# Patient Record
Sex: FEMALE | Race: WHITE | NOT HISPANIC OR LATINO | Employment: OTHER | URBAN - METROPOLITAN AREA
[De-identification: names, ages, dates, MRNs, and addresses within clinical notes are randomized per-mention and may not be internally consistent; named-entity substitution may affect disease eponyms.]

---

## 2023-06-12 PROBLEM — R05.3 CHRONIC COUGH: Status: ACTIVE | Noted: 2023-06-12

## 2023-06-12 PROBLEM — R74.8 ELEVATED ALKALINE PHOSPHATASE LEVEL: Status: ACTIVE | Noted: 2023-06-12

## 2023-06-12 PROBLEM — L93.0 CHRONIC DISCOID LUPUS ERYTHEMATOSUS: Status: ACTIVE | Noted: 2023-06-12

## 2023-06-12 RX ORDER — HYDROXYCHLOROQUINE SULFATE 200 MG/1
1.5 TABLET, FILM COATED ORAL DAILY
COMMUNITY
Start: 2021-03-29 | End: 2023-06-16 | Stop reason: ALTCHOICE

## 2023-06-12 RX ORDER — VARENICLINE TARTRATE 1 MG/1
1 TABLET, FILM COATED ORAL 2 TIMES DAILY
COMMUNITY
End: 2023-06-16 | Stop reason: SINTOL

## 2023-06-16 ENCOUNTER — OFFICE VISIT (OUTPATIENT)
Dept: PRIMARY CARE | Facility: CLINIC | Age: 58
End: 2023-06-16
Payer: COMMERCIAL

## 2023-06-16 VITALS
SYSTOLIC BLOOD PRESSURE: 124 MMHG | WEIGHT: 143 LBS | DIASTOLIC BLOOD PRESSURE: 76 MMHG | HEIGHT: 58 IN | BODY MASS INDEX: 30.02 KG/M2

## 2023-06-16 DIAGNOSIS — Z00.00 HEALTH CARE MAINTENANCE: Primary | ICD-10-CM

## 2023-06-16 DIAGNOSIS — F17.210 SMOKING GREATER THAN 30 PACK YEARS: ICD-10-CM

## 2023-06-16 DIAGNOSIS — Z12.31 ENCOUNTER FOR SCREENING MAMMOGRAM FOR MALIGNANT NEOPLASM OF BREAST: ICD-10-CM

## 2023-06-16 DIAGNOSIS — R53.83 OTHER FATIGUE: ICD-10-CM

## 2023-06-16 PROCEDURE — 99396 PREV VISIT EST AGE 40-64: CPT | Performed by: INTERNAL MEDICINE

## 2023-06-16 PROCEDURE — 88175 CYTOPATH C/V AUTO FLUID REDO: CPT

## 2023-06-16 RX ORDER — BUPROPION HYDROCHLORIDE 150 MG/1
150 TABLET, EXTENDED RELEASE ORAL 2 TIMES DAILY
Qty: 60 TABLET | Refills: 1 | Status: SHIPPED | OUTPATIENT
Start: 2023-06-16 | End: 2023-10-26 | Stop reason: WASHOUT

## 2023-06-16 ASSESSMENT — PATIENT HEALTH QUESTIONNAIRE - PHQ9
2. FEELING DOWN, DEPRESSED OR HOPELESS: NOT AT ALL
1. LITTLE INTEREST OR PLEASURE IN DOING THINGS: NOT AT ALL
SUM OF ALL RESPONSES TO PHQ9 QUESTIONS 1 AND 2: 0

## 2023-06-16 NOTE — PROGRESS NOTES
"Reason for Visit: Annual Physical Exam  Allergies and Medications  Pollen extracts  Current Outpatient Medications   Medication Instructions    buPROPion SR (WELLBUTRIN SR) 150 mg, oral, 2 times daily, Do not crush, chew, or split.    hydroxychloroquine (Plaquenil) 200 mg tablet 1.5 tablets, oral, Daily    varenicline (Chantix Continuing Month Box) 1 mg tablet 1 tablet, oral, 2 times daily     HPI:  57-year-old female patient presenting to the office today for her annual examination and to establish care.    Patient denies any chest pain and shortness of breath not even on exertion, she denies abdominal pain nausea vomiting changes in the bowel habits she is occasionally having loose stools and sometimes she has an external hemorrhoid that occasionally she noticed some blood on the toilet paper.  She denies any urinary symptoms.  Her appetite is good and energy level is adequate.    ROS otherwise negative aside from what was mentioned above in HPI.    Vitals  /76   Ht 1.461 m (4' 9.5\")   Wt 64.9 kg (143 lb)   BMI 30.41 kg/m²   Body mass index is 30.41 kg/m².  Physical Exam  Physical Exam       Active Problem List    Comprehensive Medical/Surgical/Social/Family History  Past Medical History:   Diagnosis Date    Other conditions influencing health status     No significant past medical history    Personal history of other diseases of the female genital tract     History of acute pelvic inflammatory disease     Past Surgical History:   Procedure Laterality Date    OTHER SURGICAL HISTORY  12/09/2020    Oophorectomy     Social History     Social History Narrative    Not on file   Patient continues to smoke she smoked for 30 years 1 pack/day and she continues to drink alcohol she drinks 1 glass of wine every night sometimes a seltzer and more on the weekends.  She works in the financial firm as a consultant.  She does not have any children she is single.    Family history:  Her father did die 6 years ago he was " in his early 90s.  Her mother is living in good health and she is living independently.  No family history of colon cancer or breast cancer and she has 1 sister healthy.  No family history on file.       Assessment and Plan:  Problem List Items Addressed This Visit    None  Visit Diagnoses       Health care maintenance    -  Primary    Relevant Orders    CBC    Comprehensive Metabolic Panel    Lipid Panel    TSH with reflex to Free T4 if abnormal    Vitamin D 25 hydroxy    BI mammo bilateral screening tomosynthesis    THINPREP PAP    Other fatigue        Relevant Orders    Vitamin D 25 hydroxy    Smoking greater than 30 pack years        Relevant Medications    buPROPion SR (Wellbutrin SR) 150 mg 12 hr tablet        57-year-old patient with the following issues.    1.  Ongoing tobacco use today we did discuss significantly the importance of quitting smoking.  Patient is interested in Wellbutrin and a prescription will be provided.  And hopefully the patient will succeed.    2.  Concerns regarding excessive alcohol use we did discuss the importance of cutting back on alcohol use and we are going to check the liver function test and we will discuss the results with the patient once available.    3.  Health maintenance issues, Pap smear was updated patient had an abnormal Pap within the last 5 years.  Mammogram requested she is up-to-date on her colonoscopy.  Vaccinations are up-to-date.    Disposition, I will see the patient back in the office in 1 year for repeat physical in 6 months for follow-up

## 2023-06-19 ENCOUNTER — LAB (OUTPATIENT)
Dept: LAB | Facility: LAB | Age: 58
End: 2023-06-19
Payer: COMMERCIAL

## 2023-06-19 DIAGNOSIS — Z00.00 HEALTH CARE MAINTENANCE: ICD-10-CM

## 2023-06-19 DIAGNOSIS — R53.83 OTHER FATIGUE: ICD-10-CM

## 2023-06-19 LAB
ALANINE AMINOTRANSFERASE (SGPT) (U/L) IN SER/PLAS: 29 U/L (ref 7–45)
ALBUMIN (G/DL) IN SER/PLAS: 4 G/DL (ref 3.4–5)
ALKALINE PHOSPHATASE (U/L) IN SER/PLAS: 148 U/L (ref 33–110)
ANION GAP IN SER/PLAS: 12 MMOL/L (ref 10–20)
ASPARTATE AMINOTRANSFERASE (SGOT) (U/L) IN SER/PLAS: 23 U/L (ref 9–39)
BILIRUBIN TOTAL (MG/DL) IN SER/PLAS: 0.6 MG/DL (ref 0–1.2)
CALCIDIOL (25 OH VITAMIN D3) (NG/ML) IN SER/PLAS: 25 NG/ML
CALCIUM (MG/DL) IN SER/PLAS: 9.9 MG/DL (ref 8.6–10.3)
CARBON DIOXIDE, TOTAL (MMOL/L) IN SER/PLAS: 26 MMOL/L (ref 21–32)
CHLORIDE (MMOL/L) IN SER/PLAS: 105 MMOL/L (ref 98–107)
CHOLESTEROL (MG/DL) IN SER/PLAS: 284 MG/DL (ref 0–199)
CHOLESTEROL IN HDL (MG/DL) IN SER/PLAS: 41.9 MG/DL
CHOLESTEROL/HDL RATIO: 6.8
CREATININE (MG/DL) IN SER/PLAS: 0.71 MG/DL (ref 0.5–1.05)
ERYTHROCYTE DISTRIBUTION WIDTH (RATIO) BY AUTOMATED COUNT: 13.1 % (ref 11.5–14.5)
ERYTHROCYTE MEAN CORPUSCULAR HEMOGLOBIN CONCENTRATION (G/DL) BY AUTOMATED: 33.3 G/DL (ref 32–36)
ERYTHROCYTE MEAN CORPUSCULAR VOLUME (FL) BY AUTOMATED COUNT: 95 FL (ref 80–100)
ERYTHROCYTES (10*6/UL) IN BLOOD BY AUTOMATED COUNT: 4.66 X10E12/L (ref 4–5.2)
GFR FEMALE: >90 ML/MIN/1.73M2
GLUCOSE (MG/DL) IN SER/PLAS: 100 MG/DL (ref 74–99)
HEMATOCRIT (%) IN BLOOD BY AUTOMATED COUNT: 44.2 % (ref 36–46)
HEMOGLOBIN (G/DL) IN BLOOD: 14.7 G/DL (ref 12–16)
LDL: 204 MG/DL (ref 0–99)
LEUKOCYTES (10*3/UL) IN BLOOD BY AUTOMATED COUNT: 8.3 X10E9/L (ref 4.4–11.3)
PLATELETS (10*3/UL) IN BLOOD AUTOMATED COUNT: 449 X10E9/L (ref 150–450)
POTASSIUM (MMOL/L) IN SER/PLAS: 4 MMOL/L (ref 3.5–5.3)
PROTEIN TOTAL: 7.5 G/DL (ref 6.4–8.2)
SODIUM (MMOL/L) IN SER/PLAS: 139 MMOL/L (ref 136–145)
THYROTROPIN (MIU/L) IN SER/PLAS BY DETECTION LIMIT <= 0.05 MIU/L: 6.31 MIU/L (ref 0.44–3.98)
THYROXINE (T4) FREE (NG/DL) IN SER/PLAS: 0.84 NG/DL (ref 0.61–1.12)
TRIGLYCERIDE (MG/DL) IN SER/PLAS: 192 MG/DL (ref 0–149)
UREA NITROGEN (MG/DL) IN SER/PLAS: 11 MG/DL (ref 6–23)
VLDL: 38 MG/DL (ref 0–40)

## 2023-06-19 PROCEDURE — 85027 COMPLETE CBC AUTOMATED: CPT

## 2023-06-19 PROCEDURE — 84443 ASSAY THYROID STIM HORMONE: CPT

## 2023-06-19 PROCEDURE — 80061 LIPID PANEL: CPT

## 2023-06-19 PROCEDURE — 84439 ASSAY OF FREE THYROXINE: CPT

## 2023-06-19 PROCEDURE — 80053 COMPREHEN METABOLIC PANEL: CPT

## 2023-06-19 PROCEDURE — 82306 VITAMIN D 25 HYDROXY: CPT

## 2023-06-19 PROCEDURE — 36415 COLL VENOUS BLD VENIPUNCTURE: CPT

## 2023-06-22 LAB
COMPLETE PATHOLOGY REPORT: NORMAL
CONVERTED CLINICAL DIAGNOSIS-HISTORY: NORMAL
CONVERTED DIAGNOSIS COMMENT: NORMAL
CONVERTED FINAL DIAGNOSIS: NORMAL
CONVERTED FINAL REPORT PDF LINK TO COPY AND PASTE: NORMAL

## 2023-06-28 ENCOUNTER — TELEPHONE (OUTPATIENT)
Dept: PRIMARY CARE | Facility: CLINIC | Age: 58
End: 2023-06-28
Payer: COMMERCIAL

## 2023-07-20 ENCOUNTER — OFFICE VISIT (OUTPATIENT)
Dept: PRIMARY CARE | Facility: CLINIC | Age: 58
End: 2023-07-20
Payer: COMMERCIAL

## 2023-07-20 VITALS
HEIGHT: 58 IN | BODY MASS INDEX: 29.39 KG/M2 | SYSTOLIC BLOOD PRESSURE: 142 MMHG | OXYGEN SATURATION: 96 % | WEIGHT: 140 LBS | HEART RATE: 78 BPM | DIASTOLIC BLOOD PRESSURE: 82 MMHG

## 2023-07-20 DIAGNOSIS — F17.200 SMOKING ADDICTION: ICD-10-CM

## 2023-07-20 DIAGNOSIS — R74.8 ELEVATED ALKALINE PHOSPHATASE LEVEL: Primary | ICD-10-CM

## 2023-07-20 DIAGNOSIS — E78.5 HYPERLIPIDEMIA, UNSPECIFIED HYPERLIPIDEMIA TYPE: ICD-10-CM

## 2023-07-20 DIAGNOSIS — R79.89 ELEVATED TSH: ICD-10-CM

## 2023-07-20 PROCEDURE — 99215 OFFICE O/P EST HI 40 MIN: CPT | Performed by: INTERNAL MEDICINE

## 2023-07-20 RX ORDER — IBUPROFEN 200 MG
1 TABLET ORAL EVERY 24 HOURS
Qty: 30 PATCH | Refills: 0 | Status: SHIPPED | OUTPATIENT
Start: 2023-07-20 | End: 2023-10-26 | Stop reason: WASHOUT

## 2023-07-20 RX ORDER — NICOTINE 7MG/24HR
1 PATCH, TRANSDERMAL 24 HOURS TRANSDERMAL EVERY 24 HOURS
Qty: 14 PATCH | Refills: 0 | Status: SHIPPED | OUTPATIENT
Start: 2023-07-20 | End: 2023-10-26 | Stop reason: WASHOUT

## 2023-07-20 RX ORDER — ATORVASTATIN CALCIUM 20 MG/1
20 TABLET, FILM COATED ORAL DAILY
Qty: 30 TABLET | Refills: 5 | Status: SHIPPED | OUTPATIENT
Start: 2023-07-20 | End: 2024-01-16

## 2023-07-20 NOTE — PROGRESS NOTES
"Subjective   Patient ID: [id number]    HPI   Rosalba Edwards is a 57 y.o. female who presents for Follow-up.    Current Outpatient Medications:     buPROPion SR (Wellbutrin SR) 150 mg 12 hr tablet, Take 1 tablet (150 mg) by mouth 2 times a day. Do not crush, chew, or split. (Patient not taking: Reported on 7/20/2023), Disp: 60 tablet, Rfl: 1  HPI  57-year-old female patient presenting to the office today to discuss lab results and to also discuss smoking cessation.    Patient had her blood work done and she is here to go over the results.  Also she is interested in smoking cessation she had a prescription for Wellbutrin but she was reluctant to use it because of the risk of taking Wellbutrin with alcohol consumption.  We did discuss her alcohol consumption she does drink every night at least a glass of wine and more on the weekend.  We did discuss the risk of excessive alcohol consumption and the poor effects on the liver.  We discussed options for smoking cessation including Chantix and nicotine patches as well.  Review of system was reviewed all normal except what is noted in HPI   Past Medical History:   Diagnosis Date    Other conditions influencing health status     No significant past medical history    Personal history of other diseases of the female genital tract     History of acute pelvic inflammatory disease      Objective   /82   Pulse 78   Ht 1.461 m (4' 9.5\")   Wt 63.5 kg (140 lb)   SpO2 96%   BMI 29.77 kg/m²      Physical Exam  Constitutional:       Appearance: Normal appearance.   Cardiovascular:      Rate and Rhythm: Normal rate and regular rhythm.      Pulses: Normal pulses.      Heart sounds: Normal heart sounds.   Pulmonary:      Effort: Pulmonary effort is normal.      Breath sounds: Normal breath sounds.   Neurological:      General: No focal deficit present.      Mental Status: She is alert. Mental status is at baseline.   Psychiatric:         Mood and Affect: Mood normal.         " Behavior: Behavior normal.         Thought Content: Thought content normal.         Judgment: Judgment normal.       Component      Latest Ref Rng 6/19/2023   GLUCOSE      74 - 99 mg/dL 100 (H)    SODIUM      136 - 145 mmol/L 139    POTASSIUM      3.5 - 5.3 mmol/L 4.0    CHLORIDE      98 - 107 mmol/L 105    Bicarbonate      21 - 32 mmol/L 26    Anion Gap      10 - 20 mmol/L 12    Blood Urea Nitrogen      6 - 23 mg/dL 11    Creatinine      0.50 - 1.05 mg/dL 0.71    GFR Female      >90 mL/min/1.73m2 >90    Calcium      8.6 - 10.3 mg/dL 9.9    Albumin      3.4 - 5.0 g/dL 4.0    Alkaline Phosphatase      33 - 110 U/L 148 (H)    Total Protein      6.4 - 8.2 g/dL 7.5    AST      9 - 39 U/L 23    Bilirubin Total      0.0 - 1.2 mg/dL 0.6    ALT      7 - 45 U/L 29    WBC      4.4 - 11.3 x10E9/L 8.3    RBC      4.00 - 5.20 x10E12/L 4.66    HEMOGLOBIN      12.0 - 16.0 g/dL 14.7    HEMATOCRIT      36.0 - 46.0 % 44.2    MCV      80 - 100 fL 95    MCHC      32.0 - 36.0 g/dL 33.3    Platelets      150 - 450 x10E9/L 449    RED CELL DISTRIBUTION WIDTH      11.5 - 14.5 % 13.1    CHOLESTEROL      0 - 199 mg/dL 284 (H)    HDL CHOLESTEROL      mg/dL 41.9    Cholesterol/HDL Ratio 6.8 !    LDL      0 - 99 mg/dL 204 (H)    VLDL      0 - 40 mg/dL 38    TRIGLYCERIDES      0 - 149 mg/dL 192 (H)    Thyroid Stimulating Hormone      0.44 - 3.98 mIU/L 6.31 (H)    Vitamin D, 25-Hydroxy, Total      ng/mL 25 !    Thyroxine, Free      0.61 - 1.12 ng/dL 0.84         Assessment/Plan   Problem List Items Addressed This Visit    None  To 7-year-old patient with the following issues.    1.  Concerns regarding hyperlipidemia, at this point we are going to start the patient on atorvastatin 20 mg p.o. daily and I will see her back in 3 months for follow-up with repeat fasting lipid profile patient stated understanding meanwhile she was encouraged to monitor her diet and improve her activity as well.    2.  Ongoing tobacco use, we did discuss the importance  of smoking cessation.  We are going to try nicotine patches and hopefully the patient will be successful.    3.  Excessive alcohol consumption we did discuss the importance of cutting back on her alcohol intake and the dangers and the comorbidities associated with increase in excessive alcohol intake.    4.  Elevated alkaline phosphatase, at this point liver ultrasound will be obtained and I will discuss the results with the patient once available.  There could be evidence of fatty infiltration of the liver or I wonder if there is a component of alcoholic liver disease.    Disposition I will see the patient back in the office in 3 months for follow-up and sooner if needed.    Pushpa Meehan MD

## 2023-08-10 ENCOUNTER — TELEPHONE (OUTPATIENT)
Dept: PRIMARY CARE | Facility: CLINIC | Age: 58
End: 2023-08-10
Payer: COMMERCIAL

## 2023-10-06 ENCOUNTER — LAB (OUTPATIENT)
Dept: LAB | Facility: LAB | Age: 58
End: 2023-10-06
Payer: COMMERCIAL

## 2023-10-06 DIAGNOSIS — R74.8 ABNORMAL LEVELS OF OTHER SERUM ENZYMES: Primary | ICD-10-CM

## 2023-10-06 DIAGNOSIS — R79.89 ELEVATED TSH: ICD-10-CM

## 2023-10-06 DIAGNOSIS — E78.5 HYPERLIPIDEMIA, UNSPECIFIED HYPERLIPIDEMIA TYPE: ICD-10-CM

## 2023-10-06 LAB
AFP SERPL-MCNC: 4 NG/ML (ref 0–9)
ALBUMIN SERPL BCP-MCNC: 3.8 G/DL (ref 3.4–5)
ALP SERPL-CCNC: 167 U/L (ref 33–110)
ALT SERPL W P-5'-P-CCNC: 29 U/L (ref 7–45)
ANION GAP SERPL CALC-SCNC: 13 MMOL/L (ref 10–20)
AST SERPL W P-5'-P-CCNC: 26 U/L (ref 9–39)
BILIRUB DIRECT SERPL-MCNC: 0.1 MG/DL (ref 0–0.3)
BILIRUB SERPL-MCNC: 0.8 MG/DL (ref 0–1.2)
BUN SERPL-MCNC: 12 MG/DL (ref 6–23)
CALCIUM SERPL-MCNC: 9.5 MG/DL (ref 8.6–10.3)
CERULOPLASMIN SERPL-MCNC: 34.3 MG/DL (ref 20–60)
CHLORIDE SERPL-SCNC: 105 MMOL/L (ref 98–107)
CHOLEST SERPL-MCNC: 168 MG/DL (ref 0–199)
CHOLESTEROL/HDL RATIO: 3.4
CO2 SERPL-SCNC: 26 MMOL/L (ref 21–32)
CREAT SERPL-MCNC: 0.79 MG/DL (ref 0.5–1.05)
FERRITIN SERPL-MCNC: 234 NG/ML (ref 8–150)
GFR SERPL CREATININE-BSD FRML MDRD: 88 ML/MIN/1.73M*2
GGT SERPL-CCNC: 289 U/L (ref 5–55)
GLUCOSE SERPL-MCNC: 82 MG/DL (ref 74–99)
HAV IGM SER QL: NONREACTIVE
HBV CORE IGM SER QL: NONREACTIVE
HBV SURFACE AG SERPL QL IA: NONREACTIVE
HCV AB SER QL: NONREACTIVE
HDLC SERPL-MCNC: 49.6 MG/DL
INR PPP: 1 (ref 0.9–1.1)
IRON SATN MFR SERPL: 64 % (ref 25–45)
IRON SERPL-MCNC: 191 UG/DL (ref 35–150)
LDLC SERPL CALC-MCNC: 87 MG/DL (ref 140–190)
NON HDL CHOLESTEROL: 118 MG/DL (ref 0–149)
POTASSIUM SERPL-SCNC: 4.2 MMOL/L (ref 3.5–5.3)
PROT SERPL-MCNC: 7.1 G/DL (ref 6.4–8.2)
PROTHROMBIN TIME: 10.9 SECONDS (ref 9.8–12.8)
SODIUM SERPL-SCNC: 140 MMOL/L (ref 136–145)
T4 FREE SERPL-MCNC: 0.99 NG/DL (ref 0.61–1.12)
TIBC SERPL-MCNC: 300 UG/DL (ref 240–445)
TRIGL SERPL-MCNC: 156 MG/DL (ref 0–149)
TSH SERPL-ACNC: 9.86 MIU/L (ref 0.44–3.98)
UIBC SERPL-MCNC: 109 UG/DL (ref 110–370)
VLDL: 31 MG/DL (ref 0–40)

## 2023-10-06 PROCEDURE — 82248 BILIRUBIN DIRECT: CPT

## 2023-10-06 PROCEDURE — 80074 ACUTE HEPATITIS PANEL: CPT

## 2023-10-06 PROCEDURE — 85610 PROTHROMBIN TIME: CPT

## 2023-10-06 PROCEDURE — 86015 ACTIN ANTIBODY EACH: CPT

## 2023-10-06 PROCEDURE — 82105 ALPHA-FETOPROTEIN SERUM: CPT

## 2023-10-06 PROCEDURE — 83550 IRON BINDING TEST: CPT

## 2023-10-06 PROCEDURE — 84439 ASSAY OF FREE THYROXINE: CPT

## 2023-10-06 PROCEDURE — 36415 COLL VENOUS BLD VENIPUNCTURE: CPT

## 2023-10-06 PROCEDURE — 84443 ASSAY THYROID STIM HORMONE: CPT

## 2023-10-06 PROCEDURE — 80061 LIPID PANEL: CPT

## 2023-10-06 PROCEDURE — 80053 COMPREHEN METABOLIC PANEL: CPT

## 2023-10-06 PROCEDURE — 82977 ASSAY OF GGT: CPT

## 2023-10-06 PROCEDURE — 82104 ALPHA-1-ANTITRYPSIN PHENO: CPT

## 2023-10-06 PROCEDURE — 82728 ASSAY OF FERRITIN: CPT

## 2023-10-06 PROCEDURE — 86256 FLUORESCENT ANTIBODY TITER: CPT

## 2023-10-06 PROCEDURE — 86381 MITOCHONDRIAL ANTIBODY EACH: CPT

## 2023-10-06 PROCEDURE — 83540 ASSAY OF IRON: CPT

## 2023-10-06 PROCEDURE — 82390 ASSAY OF CERULOPLASMIN: CPT

## 2023-10-09 LAB
A1AT PHENOTYP SERPL-IMP: NORMAL
A1AT SERPL-MCNC: 163 MG/DL (ref 90–200)
MITOCHONDRIA AB SER QL IF: NEGATIVE

## 2023-10-10 ENCOUNTER — APPOINTMENT (OUTPATIENT)
Dept: PRIMARY CARE | Facility: CLINIC | Age: 58
End: 2023-10-10
Payer: COMMERCIAL

## 2023-10-10 LAB — SMOOTH MUSCLE AB SER QL IF: ABNORMAL

## 2023-10-13 ENCOUNTER — CLINICAL SUPPORT (OUTPATIENT)
Dept: GASTROENTEROLOGY | Facility: HOSPITAL | Age: 58
End: 2023-10-13
Payer: COMMERCIAL

## 2023-10-13 DIAGNOSIS — R74.8 ABNORMAL LEVELS OF OTHER SERUM ENZYMES: ICD-10-CM

## 2023-10-13 PROCEDURE — 91200 LIVER ELASTOGRAPHY: CPT

## 2023-10-13 PROCEDURE — 91200 LIVER ELASTOGRAPHY: CPT | Performed by: INTERNAL MEDICINE

## 2023-10-13 NOTE — PROGRESS NOTES
diagnosticPatient referred by Mercedes Randolph CNP for Fibroscan study for diagnosis of elevated alkaline phosphatase level.  Confirmed fasting for 3 hours prior  to study.    Fibroscan study obtained using M probe and 12 consecutive valid measurements obtained.  Patient tolerated procedure well.    Advised that Dr. Jacob Mitchell will read the study and contact her provider with the results.    JAY Cao, AMB-BC

## 2023-10-19 DIAGNOSIS — K76.0 NON-ALCOHOLIC FATTY LIVER DISEASE: Primary | ICD-10-CM

## 2023-10-26 ENCOUNTER — OFFICE VISIT (OUTPATIENT)
Dept: PRIMARY CARE | Facility: CLINIC | Age: 58
End: 2023-10-26
Payer: COMMERCIAL

## 2023-10-26 VITALS
DIASTOLIC BLOOD PRESSURE: 90 MMHG | WEIGHT: 130 LBS | OXYGEN SATURATION: 98 % | HEIGHT: 58 IN | SYSTOLIC BLOOD PRESSURE: 120 MMHG | BODY MASS INDEX: 27.29 KG/M2 | HEART RATE: 77 BPM

## 2023-10-26 DIAGNOSIS — E03.8 SUBCLINICAL HYPOTHYROIDISM: Primary | ICD-10-CM

## 2023-10-26 PROCEDURE — 99214 OFFICE O/P EST MOD 30 MIN: CPT | Performed by: INTERNAL MEDICINE

## 2023-10-26 PROCEDURE — 4004F PT TOBACCO SCREEN RCVD TLK: CPT | Performed by: INTERNAL MEDICINE

## 2023-10-26 NOTE — PROGRESS NOTES
Subjective   Patient ID: Rosalba Edwards is a 57 y.o. female who presents for Hyperlipidemia.    HPI   Patient presents today following up with hyperlipidemia. Patient is currently taking Atorvastatin with out any complications. Patient had blood work done on 10/6/2023 and would like to review the results today.     Review of Systems    Objective   There were no vitals taken for this visit.    Physical Exam    Assessment/Plan   Problem List Items Addressed This Visit    None

## 2023-10-26 NOTE — PROGRESS NOTES
"Subjective   Patient ID: [id number]     Rosalba Edwards is a 57 y.o. female who presents for Hyperlipidemia.    Current Outpatient Medications:     atorvastatin (Lipitor) 20 mg tablet, Take 1 tablet (20 mg) by mouth once daily., Disp: 30 tablet, Rfl: 5  HPI  57-year-old female patient presented to the office today for a follow-up visit.  Patient currently actively evaluated by the gastroenterology department and she is currently involved with hepatology for further evaluation of elevated liver function test and abnormal ultrasound of the liver.  She is scheduled to proceed with liver biopsy to try to determine the etiology.  There is a consideration of the possibility of lupus hepatitis.  Patient has modified her diet significantly after her hyperlipidemia diagnosis and she had lost significant amount of weight.  She also has cut back significantly on her alcohol consumption and that also led to some of the weight loss.  She has no other complaints or concerns today.  Review of system was reviewed all normal except what is noted in HPI   Past Medical History:   Diagnosis Date    Other conditions influencing health status     No significant past medical history    Personal history of other diseases of the female genital tract     History of acute pelvic inflammatory disease      Objective   /90 (BP Location: Right arm, Patient Position: Sitting)   Pulse 77   Ht 1.461 m (4' 9.5\")   Wt 59 kg (130 lb)   SpO2 98%   BMI 27.64 kg/m²      Physical Exam  Constitutional:       Appearance: Normal appearance.   Cardiovascular:      Rate and Rhythm: Normal rate and regular rhythm.      Pulses: Normal pulses.      Heart sounds: Normal heart sounds.   Pulmonary:      Effort: Pulmonary effort is normal.      Breath sounds: Normal breath sounds.   Neurological:      Mental Status: She is alert.       Assessment/Plan   Problem List Items Addressed This Visit    None  Visit Diagnoses       Subclinical hypothyroidism    -  " Primary    Relevant Orders    Referral to Endocrinology        57-year-old patient with the following issues.    1.  Undergoing further evaluation for elevated alkaline phosphatase and abnormal ultrasound of the liver indicating evidence of cirrhosis and Yelena lithiasis.  Patient just saw hematology specialist and she is scheduled to proceed with liver biopsy and she is going to go ahead and schedule that appointment.  Meanwhile she was encouraged to follow with any hepatotoxins and to cut back more on her alcohol consumption.    2.  Elevated TSH with normal thyroid function test.  At this point referral to endocrinology was obtained to discuss subclinical hypothyroidism and the need to start medication if needed.    No other active issues or concerns during this visit I will see the patient back in the office as previously scheduled and sooner as needed.    Pushpa Meehan MD

## 2023-10-31 ENCOUNTER — TELEPHONE (OUTPATIENT)
Dept: PRIMARY CARE | Facility: CLINIC | Age: 58
End: 2023-10-31
Payer: COMMERCIAL

## 2023-10-31 NOTE — TELEPHONE ENCOUNTER
Spoke with pt in attempts to set up a 6 month follow up for a visit from her last visit with you. She stated she does not believe it is needed if she didn't see any specialist yet and if she did not truly need it. She is asking if you need to really see her in for a visit. States she is having a hard time overall getting in with Endocrinology and other testing from other specialist. They do not have any availability at this time until July and I made pt aware the delay may be in regards to specialty changing to epic. Please advise if pt needs an appt with you for any meds or anything further. Thank you!

## 2023-11-13 DIAGNOSIS — J32.9 SINUSITIS, UNSPECIFIED CHRONICITY, UNSPECIFIED LOCATION: ICD-10-CM

## 2023-11-13 RX ORDER — AMOXICILLIN AND CLAVULANATE POTASSIUM 875; 125 MG/1; MG/1
875 TABLET, FILM COATED ORAL 2 TIMES DAILY
Qty: 20 TABLET | Refills: 0 | Status: SHIPPED | OUTPATIENT
Start: 2023-11-13 | End: 2023-11-23

## 2024-02-26 ENCOUNTER — HOSPITAL ENCOUNTER (OUTPATIENT)
Dept: RADIOLOGY | Facility: CLINIC | Age: 59
Discharge: HOME | End: 2024-02-26
Payer: COMMERCIAL

## 2024-02-26 DIAGNOSIS — R74.8 ABNORMAL LEVELS OF OTHER SERUM ENZYMES: ICD-10-CM

## 2024-02-26 PROCEDURE — 76705 ECHO EXAM OF ABDOMEN: CPT | Performed by: STUDENT IN AN ORGANIZED HEALTH CARE EDUCATION/TRAINING PROGRAM

## 2024-02-26 PROCEDURE — 76705 ECHO EXAM OF ABDOMEN: CPT

## 2024-05-06 ENCOUNTER — TELEPHONE (OUTPATIENT)
Dept: PRIMARY CARE | Facility: CLINIC | Age: 59
End: 2024-05-06
Payer: COMMERCIAL

## 2024-05-06 NOTE — TELEPHONE ENCOUNTER
Pt called is currently in [place] with possible food poison. Pt is constantly taking pepto bismol. Is there something else she could be taking? She stated she is able to eat but  complaining of diarrhea.      Please call pt at  [phone]

## 2024-05-13 DIAGNOSIS — R19.7 ACUTE DIARRHEA: Primary | ICD-10-CM

## 2024-05-13 NOTE — TELEPHONE ENCOUNTER
Pt would like to talk to Dr. Palumbo to follow up from her food poisoning and she also advised she tested positive for Covid yesterday and wanted to know what to take for that. Please call

## 2024-12-26 ENCOUNTER — HOSPITAL ENCOUNTER (OUTPATIENT)
Dept: RADIOLOGY | Facility: CLINIC | Age: 59
Discharge: HOME | End: 2024-12-26
Payer: COMMERCIAL

## 2024-12-26 VITALS — BODY MASS INDEX: 25.19 KG/M2 | HEIGHT: 58 IN | WEIGHT: 120 LBS

## 2024-12-26 DIAGNOSIS — Z12.31 SCREENING MAMMOGRAM FOR BREAST CANCER: ICD-10-CM

## 2024-12-26 PROCEDURE — 77067 SCR MAMMO BI INCL CAD: CPT

## 2025-01-12 ASSESSMENT — PROMIS GLOBAL HEALTH SCALE
EMOTIONAL_PROBLEMS: NEVER
RATE_QUALITY_OF_LIFE: VERY GOOD
CARRYOUT_PHYSICAL_ACTIVITIES: COMPLETELY
CARRYOUT_SOCIAL_ACTIVITIES: VERY GOOD
RATE_PHYSICAL_HEALTH: GOOD
RATE_MENTAL_HEALTH: VERY GOOD
RATE_SOCIAL_SATISFACTION: VERY GOOD
RATE_AVERAGE_PAIN: 1
RATE_GENERAL_HEALTH: GOOD

## 2025-01-14 ENCOUNTER — APPOINTMENT (OUTPATIENT)
Dept: PRIMARY CARE | Facility: CLINIC | Age: 60
End: 2025-01-14
Payer: COMMERCIAL

## 2025-01-14 VITALS
SYSTOLIC BLOOD PRESSURE: 138 MMHG | OXYGEN SATURATION: 95 % | TEMPERATURE: 98 F | RESPIRATION RATE: 18 BRPM | HEIGHT: 58 IN | HEART RATE: 77 BPM | DIASTOLIC BLOOD PRESSURE: 74 MMHG | WEIGHT: 124 LBS | BODY MASS INDEX: 26.03 KG/M2

## 2025-01-14 DIAGNOSIS — Z00.00 HEALTH CARE MAINTENANCE: Primary | ICD-10-CM

## 2025-01-14 DIAGNOSIS — Z78.9 ADMITS TO ALCOHOL CONSUMPTION: ICD-10-CM

## 2025-01-14 DIAGNOSIS — F17.200 SMOKER: ICD-10-CM

## 2025-01-14 DIAGNOSIS — L93.0 DISCOID LUPUS: ICD-10-CM

## 2025-01-14 DIAGNOSIS — Z78.0 POST-MENOPAUSAL: ICD-10-CM

## 2025-01-14 DIAGNOSIS — L98.9 SKIN LESION: ICD-10-CM

## 2025-01-14 PROCEDURE — 99396 PREV VISIT EST AGE 40-64: CPT | Performed by: INTERNAL MEDICINE

## 2025-01-14 PROCEDURE — 3008F BODY MASS INDEX DOCD: CPT | Performed by: INTERNAL MEDICINE

## 2025-01-14 ASSESSMENT — PATIENT HEALTH QUESTIONNAIRE - PHQ9
SUM OF ALL RESPONSES TO PHQ9 QUESTIONS 1 AND 2: 0
1. LITTLE INTEREST OR PLEASURE IN DOING THINGS: NOT AT ALL
2. FEELING DOWN, DEPRESSED OR HOPELESS: NOT AT ALL

## 2025-01-14 NOTE — PROGRESS NOTES
"Reason for Visit: Annual Physical Exam  Allergies and Medications  Erythromycin base and Pollen extracts  Current Outpatient Medications   Medication Instructions    atorvastatin (LIPITOR) 20 mg, oral, Daily     HPI:    59-year-old patient presented to the office today for her annual examination.  Patient is doing fairly well she denies chest pain and difficulty breathing not even on exertion she denies abdominal pain nausea or vomiting.  She denies any bowel movement changes she denies any urine symptoms.    She did cut back significantly on her alcohol consumption she used to have excessive alcohol intake in the past and she still smokes half a pack per day.    Patient is actively followed by hepatology for elevated liver function test and history of daily alcohol consumption.  She is status post liver biopsy that was completed in [place] that did not demonstrate any evidence of fibrosis despite abnormal FibroScan and ultrasound suggesting advanced fibrosis.  I believe she is scheduled to proceed further evaluation and repeat ultrasound and follow-up with the liver specialist within the next few weeks.    She also does have history of discoid lupus and she requested referral to rheumatology and dermatology.  ROS otherwise negative aside from what was mentioned above in HPI.    Vitals  /74   Pulse 77   Temp 36.7 °C (98 °F)   Resp 18   Ht 1.473 m (4' 10\")   Wt 56.2 kg (124 lb)   SpO2 95%   BMI 25.92 kg/m²   Body mass index is 25.92 kg/m².  Physical Exam  Physical Exam  Constitutional:       Appearance: Normal appearance.   Eyes:      Extraocular Movements: Extraocular movements intact.      Pupils: Pupils are equal, round, and reactive to light.   Cardiovascular:      Rate and Rhythm: Normal rate and regular rhythm.      Pulses: Normal pulses.      Heart sounds: Normal heart sounds.   Pulmonary:      Effort: Pulmonary effort is normal.      Breath sounds: Normal breath sounds.   Chest:      Comments: " Breast exam completed no masses asymmetry or discharge.  Abdominal:      General: Abdomen is flat. Bowel sounds are normal.      Palpations: Abdomen is soft.   Neurological:      General: No focal deficit present.      Mental Status: She is alert. Mental status is at baseline.   Psychiatric:         Mood and Affect: Mood normal.         Thought Content: Thought content normal.         Judgment: Judgment normal.        Active Problem List    Comprehensive Medical/Surgical/Social/Family History  Past Medical History:   Diagnosis Date    Other conditions influencing health status     No significant past medical history    Personal history of other diseases of the female genital tract     History of acute pelvic inflammatory disease     Past Surgical History:   Procedure Laterality Date    OTHER SURGICAL HISTORY  12/09/2020    Oophorectomy     Social History     Social History Narrative    Not on file   Patient still smokes she smokes half a pack per day she did cut back significantly on her alcohol consumption since the summer but she still drinks on special occasions and alcohol which she was recommended to discontinue the alcohol completely.  No family history on file.     Negative family history for colon or breast cancer.    Assessment and Plan:  Problem List Items Addressed This Visit    None  59-year-old patient with the following issues.    1.  Elevated liver function test with abnormal FibroScan and liver ultrasound indicating advanced fibrosis actively followed by hepatology department currently scheduled for repeat ultrasound and ultrasound-guided biopsy.  She will resume her follow-up with the hepatology department meanwhile patient was encouraged to cut back significantly on her alcohol consumption and avoid it completely.  Patient stated understanding.    2.  History of discoid lupus referral to rheumatology was provided patient lost her rheumatologist as also dermatology.    3.  Hyperlipidemia mild repeat  lipid profile will be obtained    4.  Ongoing tobacco use we did discuss the importance of quitting smoking and she is going to proceed with lung cancer screening she has been smoking since she was a teenager she has over 40 years of smoking.    5.  Healthcare maintenance issues lab work is requested and patient is up-to-date on her cervical cancer screening breast cancer screening I believe she needs a repeat colonoscopy but we are going to check further on that I will obtain DEXA scan because of her risk factors of alcohol consumption and tobacco use.    Disposition I will see the patient back in the office in 1 year for repeat physical in 6 months for follow-up and sooner if needed

## 2025-02-10 ENCOUNTER — APPOINTMENT (OUTPATIENT)
Dept: RADIOLOGY | Facility: CLINIC | Age: 60
End: 2025-02-10
Payer: COMMERCIAL

## 2025-02-18 ENCOUNTER — HOSPITAL ENCOUNTER (OUTPATIENT)
Dept: RADIOLOGY | Facility: CLINIC | Age: 60
Discharge: HOME | End: 2025-02-18
Payer: COMMERCIAL

## 2025-02-18 DIAGNOSIS — F17.200 SMOKER: ICD-10-CM

## 2025-02-18 DIAGNOSIS — Z78.0 POST-MENOPAUSAL: ICD-10-CM

## 2025-02-18 DIAGNOSIS — Z78.9 ADMITS TO ALCOHOL CONSUMPTION: ICD-10-CM

## 2025-02-18 DIAGNOSIS — L93.0 DISCOID LUPUS: ICD-10-CM

## 2025-02-18 PROCEDURE — 77080 DXA BONE DENSITY AXIAL: CPT | Performed by: RADIOLOGY

## 2025-02-18 PROCEDURE — 77080 DXA BONE DENSITY AXIAL: CPT

## 2025-02-19 LAB
ALBUMIN SERPL-MCNC: 4.3 G/DL (ref 3.6–5.1)
ALP SERPL-CCNC: 107 U/L (ref 37–153)
ALT SERPL-CCNC: 13 U/L (ref 6–29)
ANION GAP SERPL CALCULATED.4IONS-SCNC: 8 MMOL/L (CALC) (ref 7–17)
AST SERPL-CCNC: 19 U/L (ref 10–35)
BILIRUB SERPL-MCNC: 0.6 MG/DL (ref 0.2–1.2)
BUN SERPL-MCNC: 19 MG/DL (ref 7–25)
CALCIUM SERPL-MCNC: 9.8 MG/DL (ref 8.6–10.4)
CHLORIDE SERPL-SCNC: 107 MMOL/L (ref 98–110)
CHOLEST SERPL-MCNC: 233 MG/DL
CHOLEST/HDLC SERPL: 5.4 (CALC)
CO2 SERPL-SCNC: 25 MMOL/L (ref 20–32)
CREAT SERPL-MCNC: 0.64 MG/DL (ref 0.5–1.03)
EGFRCR SERPLBLD CKD-EPI 2021: 103 ML/MIN/1.73M2
ERYTHROCYTE [DISTWIDTH] IN BLOOD BY AUTOMATED COUNT: 11.9 % (ref 11–15)
GLUCOSE SERPL-MCNC: 96 MG/DL (ref 65–99)
HCT VFR BLD AUTO: 45.7 % (ref 35–45)
HDLC SERPL-MCNC: 43 MG/DL
HGB BLD-MCNC: 15.5 G/DL (ref 11.7–15.5)
LDLC SERPL CALC-MCNC: 156 MG/DL (CALC)
MCH RBC QN AUTO: 32.9 PG (ref 27–33)
MCHC RBC AUTO-ENTMCNC: 33.9 G/DL (ref 32–36)
MCV RBC AUTO: 97 FL (ref 80–100)
NONHDLC SERPL-MCNC: 190 MG/DL (CALC)
PLATELET # BLD AUTO: 402 THOUSAND/UL (ref 140–400)
PMV BLD REES-ECKER: 10.9 FL (ref 7.5–12.5)
POTASSIUM SERPL-SCNC: 4.3 MMOL/L (ref 3.5–5.3)
PROT SERPL-MCNC: 7.5 G/DL (ref 6.1–8.1)
RBC # BLD AUTO: 4.71 MILLION/UL (ref 3.8–5.1)
SODIUM SERPL-SCNC: 140 MMOL/L (ref 135–146)
T4 FREE SERPL-MCNC: 1.4 NG/DL (ref 0.8–1.8)
TRIGL SERPL-MCNC: 187 MG/DL
TSH SERPL-ACNC: 9.46 MIU/L (ref 0.4–4.5)
WBC # BLD AUTO: 8.5 THOUSAND/UL (ref 3.8–10.8)

## 2025-02-20 DIAGNOSIS — E78.5 HYPERLIPIDEMIA, UNSPECIFIED HYPERLIPIDEMIA TYPE: ICD-10-CM

## 2025-02-20 DIAGNOSIS — R79.89 ELEVATED TSH: Primary | ICD-10-CM

## 2025-02-21 RX ORDER — ROSUVASTATIN CALCIUM 5 MG/1
5 TABLET, COATED ORAL DAILY
Qty: 30 TABLET | Refills: 11 | Status: SHIPPED | OUTPATIENT
Start: 2025-02-21 | End: 2026-02-21

## 2025-06-25 ENCOUNTER — APPOINTMENT (OUTPATIENT)
Dept: RADIOLOGY | Facility: CLINIC | Age: 60
End: 2025-06-25
Payer: COMMERCIAL

## 2025-06-25 ENCOUNTER — HOSPITAL ENCOUNTER (OUTPATIENT)
Dept: RADIOLOGY | Facility: CLINIC | Age: 60
End: 2025-06-25
Payer: COMMERCIAL

## 2025-06-25 DIAGNOSIS — K76.0 FATTY (CHANGE OF) LIVER, NOT ELSEWHERE CLASSIFIED: ICD-10-CM

## 2025-06-25 PROCEDURE — 76705 ECHO EXAM OF ABDOMEN: CPT | Performed by: RADIOLOGY

## 2025-06-25 PROCEDURE — 76705 ECHO EXAM OF ABDOMEN: CPT

## 2025-07-03 ENCOUNTER — APPOINTMENT (OUTPATIENT)
Dept: PRIMARY CARE | Facility: CLINIC | Age: 60
End: 2025-07-03
Payer: COMMERCIAL

## 2025-07-15 ENCOUNTER — APPOINTMENT (OUTPATIENT)
Dept: PRIMARY CARE | Facility: CLINIC | Age: 60
End: 2025-07-15
Payer: COMMERCIAL

## 2026-01-20 ENCOUNTER — APPOINTMENT (OUTPATIENT)
Dept: PRIMARY CARE | Facility: CLINIC | Age: 61
End: 2026-01-20
Payer: COMMERCIAL